# Patient Record
Sex: MALE | Race: WHITE | URBAN - METROPOLITAN AREA
[De-identification: names, ages, dates, MRNs, and addresses within clinical notes are randomized per-mention and may not be internally consistent; named-entity substitution may affect disease eponyms.]

---

## 2021-09-15 ENCOUNTER — OFFICE VISIT (OUTPATIENT)
Dept: URBAN - METROPOLITAN AREA CLINIC 92 | Facility: CLINIC | Age: 71
End: 2021-09-15
Payer: MEDICARE

## 2021-09-15 DIAGNOSIS — H53.2 DIPLOPIA: ICD-10-CM

## 2021-09-15 DIAGNOSIS — H35.40 PERIPHERAL RETINAL DEGENERATION: ICD-10-CM

## 2021-09-15 DIAGNOSIS — H18.712 CORNEAL ECTASIA, LEFT EYE: ICD-10-CM

## 2021-09-15 DIAGNOSIS — H01.021 SQUAMOUS BLEPHARITIS RIGHT UPPER EYELID: Primary | ICD-10-CM

## 2021-09-15 PROCEDURE — 99203 OFFICE O/P NEW LOW 30 MIN: CPT | Performed by: OPTOMETRIST

## 2021-09-15 ASSESSMENT — INTRAOCULAR PRESSURE
OD: 10
OS: 6

## 2021-09-15 NOTE — IMPRESSION/PLAN
Impression: Peripheral retinal degeneration: H35.40. Plan: Patient educated on condition. No treatment required. Continue to monitor.

## 2021-09-15 NOTE — IMPRESSION/PLAN
Impression: Diplopia: H53.2. Plan: Patient educated no retinal or optic nerve abnormalities on examination today. Decompensated phoria versus worsening of OS ectasia. Continue to monitor. Patient to call if symptoms recur or begin to worsen. Follow up with Dr. Ale Pastrana.

## 2021-09-15 NOTE — IMPRESSION/PLAN
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 Plan: Patient aware and re-educated on condition s/p LASIK. Interested in pursuing specialty lens fit.

## 2021-09-15 NOTE — IMPRESSION/PLAN
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 Plan: Use preservative free ATs BID-TID OU. Avenova lid spray daily. Monitor.

## 2022-03-24 ENCOUNTER — OFFICE VISIT (OUTPATIENT)
Dept: URBAN - NONMETROPOLITAN AREA CLINIC 1 | Facility: CLINIC | Age: 72
End: 2022-03-24
Payer: MEDICARE

## 2022-03-24 PROCEDURE — 99213 OFFICE O/P EST LOW 20 MIN: CPT | Performed by: OPTOMETRIST

## 2022-03-24 RX ORDER — PREDNISOLONE ACETATE 10 MG/ML
1 % SUSPENSION/ DROPS OPHTHALMIC
Qty: 1 | Refills: 0 | Status: ACTIVE
Start: 2022-03-24

## 2022-03-24 RX ORDER — PREDNISOLONE ACETATE 10 MG/ML
1 % SUSPENSION/ DROPS OPHTHALMIC
Qty: 1 | Status: INACTIVE
Start: 2022-03-24 | End: 2022-03-24

## 2022-03-24 ASSESSMENT — INTRAOCULAR PRESSURE
OS: 15
OD: 10

## 2022-03-24 NOTE — IMPRESSION/PLAN
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 Plan: Educated pt on exam findings. Educated pt no apparent corneal ulcer present today, but a large amount of edema/inflammation has occurred. Recommended pt continue with Ciloxan QID OS as previously prescribed from ER. Rx'ed Prednisolone Aceatate Q1hr OS (shake bottle well). Educated pt corneal swelling likely related to ectasia post LASIK. Educated pt RTC asap if symptoms worsen. RTC 1 day for follow up.

## 2022-03-25 ENCOUNTER — OFFICE VISIT (OUTPATIENT)
Dept: URBAN - NONMETROPOLITAN AREA CLINIC 1 | Facility: CLINIC | Age: 72
End: 2022-03-25
Payer: MEDICARE

## 2022-03-25 PROCEDURE — 99213 OFFICE O/P EST LOW 20 MIN: CPT | Performed by: OPTOMETRIST

## 2022-03-25 ASSESSMENT — INTRAOCULAR PRESSURE
OD: 13
OS: 18

## 2022-03-25 NOTE — IMPRESSION/PLAN
Impression: Secondary corneal edema, left eye: N57.528. Plan: Educated pt on mild corneal improvements today. Continue Prednisolone Acetate Q1hr OD and Ciloxan QID OD. Discussed possible reduced vision after healing due to potential scarring, pt expressed understanding. Follow up Monday in [facility]- educated pt to call clinic if vision or ocular comfort worsens prior to follow up visit.

## 2022-03-28 ENCOUNTER — OFFICE VISIT (OUTPATIENT)
Dept: URBAN - METROPOLITAN AREA CLINIC 82 | Facility: CLINIC | Age: 72
End: 2022-03-28
Payer: MEDICARE

## 2022-03-28 DIAGNOSIS — H18.232 SECONDARY CORNEAL EDEMA, LEFT EYE: Primary | ICD-10-CM

## 2022-03-28 PROCEDURE — 99213 OFFICE O/P EST LOW 20 MIN: CPT | Performed by: OPTOMETRIST

## 2022-03-28 PROCEDURE — 76514 ECHO EXAM OF EYE THICKNESS: CPT | Performed by: OPTOMETRIST

## 2022-03-28 ASSESSMENT — INTRAOCULAR PRESSURE
OS: 18
OD: 16

## 2022-03-28 NOTE — IMPRESSION/PLAN
Impression: Secondary corneal edema, left eye: L05.561. Attempted Anterior Segment OCT today 
   (+)cystic edema of the cornea Could not appreciate a break in Decemet's membrane Concerned for Hydrops Central Corneal Thickness today: 515 OS Plan: Educated pt on minimal changes from 3/25/22 appointment. Recommended referral to corneal specialist for second opinion. Continue Ciloxan QID OS Recommended taper of prednisolone acetate to 4x/day  OS for the next 2 days. Recommended use of Jim 128 4x/day OS only. Educated pt on ointment option and recommended drops during day and ointment at night. Space all drops out by at least 5 minutes RTC in [place] 2 days for follow up, unless interferes with referral to cornea. Educated pt RTC asap if symptoms worsen.

## 2022-03-31 ENCOUNTER — OFFICE VISIT (OUTPATIENT)
Dept: URBAN - NONMETROPOLITAN AREA CLINIC 1 | Facility: CLINIC | Age: 72
End: 2022-03-31
Payer: MEDICARE

## 2022-03-31 PROCEDURE — 99213 OFFICE O/P EST LOW 20 MIN: CPT | Performed by: OPTOMETRIST

## 2022-03-31 ASSESSMENT — INTRAOCULAR PRESSURE: OD: 14

## 2022-03-31 NOTE — IMPRESSION/PLAN
Impression: Secondary corneal edema, left eye: D82.890. S/P LASIK OS with post surgical ectasia Plan: Educated pt on minimal changes from 3/28/22 appointment. Pt to schedule appointment with Dr. Carlos Timmons in 4-6 weeks- recommended opinion from a corneal specialist if additional treatment is needed. Continue use of Jim 128 4x/day OS only and Jim ointment at night. Educated pt burning sensation is common. RTC in [place] 2 weeks for follow up (obtain corneal pachy at follow up). Educated pt RTC asap if symptoms worsen.

## 2022-04-14 ENCOUNTER — OFFICE VISIT (OUTPATIENT)
Dept: URBAN - NONMETROPOLITAN AREA CLINIC 1 | Facility: CLINIC | Age: 72
End: 2022-04-14
Payer: MEDICARE

## 2022-04-14 DIAGNOSIS — H18.232 SECONDARY CORNEAL EDEMA, LEFT EYE: Primary | ICD-10-CM

## 2022-04-14 PROCEDURE — 99213 OFFICE O/P EST LOW 20 MIN: CPT | Performed by: OPTOMETRIST

## 2022-04-14 NOTE — IMPRESSION/PLAN
Impression: Secondary corneal edema, left eye: E58.114. S/P LASIK OS with post surgical ectasia Plan: Educated pt on minimal changes from 3/31/22 appointment. Pt unable to schedule appointment with Dr. Bridgette Lee- recommended opinion from a corneal specialist if additional treatment is needed, but notes he was able to have appointment scheduled with Cornea Specialist in [place] next week. Highly encouraged pt go to to appointment. Continue use of Jim 128 4x/day OS only and Jim ointment at night. Recommended restarting Prednisolone Acetate 1% QID OS only. Educated pt burning sensation is common. RTC as needed after seeing cornea specialist.
Educated pt RTC asap if symptoms worsen.